# Patient Record
Sex: FEMALE | Race: WHITE | NOT HISPANIC OR LATINO | URBAN - METROPOLITAN AREA
[De-identification: names, ages, dates, MRNs, and addresses within clinical notes are randomized per-mention and may not be internally consistent; named-entity substitution may affect disease eponyms.]

---

## 2017-03-07 ENCOUNTER — OUTPATIENT (OUTPATIENT)
Dept: OUTPATIENT SERVICES | Facility: HOSPITAL | Age: 82
LOS: 1 days | End: 2017-03-07
Payer: MEDICARE

## 2017-03-07 VITALS
WEIGHT: 171.96 LBS | SYSTOLIC BLOOD PRESSURE: 121 MMHG | OXYGEN SATURATION: 97 % | DIASTOLIC BLOOD PRESSURE: 70 MMHG | HEART RATE: 62 BPM | RESPIRATION RATE: 19 BRPM | HEIGHT: 61 IN | TEMPERATURE: 98 F

## 2017-03-07 DIAGNOSIS — Z96.641 PRESENCE OF RIGHT ARTIFICIAL HIP JOINT: Chronic | ICD-10-CM

## 2017-03-07 DIAGNOSIS — M65.321 TRIGGER FINGER, RIGHT INDEX FINGER: ICD-10-CM

## 2017-03-07 DIAGNOSIS — Z95.810 PRESENCE OF AUTOMATIC (IMPLANTABLE) CARDIAC DEFIBRILLATOR: Chronic | ICD-10-CM

## 2017-03-07 DIAGNOSIS — G56.02 CARPAL TUNNEL SYNDROME, LEFT UPPER LIMB: Chronic | ICD-10-CM

## 2017-03-07 DIAGNOSIS — Z98.41 CATARACT EXTRACTION STATUS, RIGHT EYE: Chronic | ICD-10-CM

## 2017-03-07 DIAGNOSIS — G56.01 CARPAL TUNNEL SYNDROME, RIGHT UPPER LIMB: ICD-10-CM

## 2017-03-07 DIAGNOSIS — Z01.818 ENCOUNTER FOR OTHER PREPROCEDURAL EXAMINATION: ICD-10-CM

## 2017-03-07 DIAGNOSIS — Z90.49 ACQUIRED ABSENCE OF OTHER SPECIFIED PARTS OF DIGESTIVE TRACT: Chronic | ICD-10-CM

## 2017-03-07 LAB
ANION GAP SERPL CALC-SCNC: 8 MMOL/L — SIGNIFICANT CHANGE UP (ref 5–17)
BUN SERPL-MCNC: 32 MG/DL — HIGH (ref 7–23)
CALCIUM SERPL-MCNC: 10 MG/DL — SIGNIFICANT CHANGE UP (ref 8.4–10.5)
CHLORIDE SERPL-SCNC: 105 MMOL/L — SIGNIFICANT CHANGE UP (ref 96–108)
CO2 SERPL-SCNC: 30 MMOL/L — SIGNIFICANT CHANGE UP (ref 22–31)
CREAT SERPL-MCNC: 1 MG/DL — SIGNIFICANT CHANGE UP (ref 0.5–1.3)
GLUCOSE SERPL-MCNC: 129 MG/DL — HIGH (ref 70–99)
HCT VFR BLD CALC: 37.6 % — SIGNIFICANT CHANGE UP (ref 34.5–45)
HGB BLD-MCNC: 11.8 G/DL — SIGNIFICANT CHANGE UP (ref 11.5–15.5)
MCHC RBC-ENTMCNC: 31.3 GM/DL — LOW (ref 32–36)
MCHC RBC-ENTMCNC: 32 PG — SIGNIFICANT CHANGE UP (ref 27–34)
MCV RBC AUTO: 102.2 FL — HIGH (ref 80–100)
PLATELET # BLD AUTO: 229 K/UL — SIGNIFICANT CHANGE UP (ref 150–400)
POTASSIUM SERPL-MCNC: 4.9 MMOL/L — SIGNIFICANT CHANGE UP (ref 3.5–5.3)
POTASSIUM SERPL-SCNC: 4.9 MMOL/L — SIGNIFICANT CHANGE UP (ref 3.5–5.3)
RBC # BLD: 3.68 M/UL — LOW (ref 3.8–5.2)
RBC # FLD: 13.6 % — SIGNIFICANT CHANGE UP (ref 10.3–14.5)
SODIUM SERPL-SCNC: 143 MMOL/L — SIGNIFICANT CHANGE UP (ref 135–145)
WBC # BLD: 7.5 K/UL — SIGNIFICANT CHANGE UP (ref 3.8–10.5)
WBC # FLD AUTO: 7.5 K/UL — SIGNIFICANT CHANGE UP (ref 3.8–10.5)

## 2017-03-07 PROCEDURE — 93005 ELECTROCARDIOGRAM TRACING: CPT

## 2017-03-07 PROCEDURE — G0463: CPT

## 2017-03-07 PROCEDURE — 93010 ELECTROCARDIOGRAM REPORT: CPT | Mod: NC

## 2017-03-07 PROCEDURE — 85027 COMPLETE CBC AUTOMATED: CPT

## 2017-03-07 PROCEDURE — 36415 COLL VENOUS BLD VENIPUNCTURE: CPT

## 2017-03-07 PROCEDURE — 80048 BASIC METABOLIC PNL TOTAL CA: CPT

## 2017-03-07 RX ORDER — PANTOPRAZOLE SODIUM 20 MG/1
1 TABLET, DELAYED RELEASE ORAL
Qty: 0 | Refills: 0 | COMMUNITY

## 2017-03-07 NOTE — H&P PST ADULT - FAMILY HISTORY
Sibling  Still living? Unknown  Family history of breast cancer, Age at diagnosis: Age Unknown     Mother  Still living? Unknown  Family history of uterine cancer, Age at diagnosis: Age Unknown

## 2017-03-07 NOTE — H&P PST ADULT - REASON FOR ADMISSION
" Dr. Daniel is going to operate on my right hand for carpal tunnel surgery and my right hand index finger"

## 2017-03-07 NOTE — H&P PST ADULT - MUSCULOSKELETAL COMMENTS
right carpal tunnel syndrome and right index trigger finger limited ROM to bilateral wrist s/t pain. Joint swelling of right great toe, followed by PCP

## 2017-03-07 NOTE — H&P PST ADULT - NSANTHOSAYNRD_GEN_A_CORE
No. ARTIE screening performed.  STOP BANG Legend: 0-2 = LOW Risk; 3-4 = INTERMEDIATE Risk; 5-8 = HIGH Risk

## 2017-03-07 NOTE — H&P PST ADULT - SKIN COMMENTS
pt pointed out the lump on left clavicle, movable soft, nontender, measuring about [id number], pt follows it with PCP

## 2017-03-07 NOTE — H&P PST ADULT - PMH
AICD (automatic cardioverter/defibrillator) present    Anxiety    Aortic stenosis    CAD (coronary artery disease)    Carpal tunnel syndrome on right    Depression    Fibromyalgia    GERD (gastroesophageal reflux disease)    History of CHF (congestive heart failure)    HTN (hypertension)    Non-ischemic cardiomyopathy    Obesity (BMI 30.0-34.9)    Osteoarthritis    Peptic ulcer    Restless leg syndrome    Trigger finger, right index finger

## 2017-03-07 NOTE — H&P PST ADULT - HISTORY OF PRESENT ILLNESS
85 yo female scheduled for " right carpal tunnel release and right index finger A1 pulley release" on 3/24/17 with Flor Daniel MD.   Patient complains of right hand pain for more than a year, constant 10/10 aching, loss of full sensation with numbness and tingling to all fingers radiating to upper right arm. She has tried cortisone injection without relief. Patient uses walker for stability and walking.   Accompanied by daughter today.

## 2017-03-07 NOTE — H&P PST ADULT - PROBLEM SELECTOR PLAN 1
"Right carpal tunnel release and right index finger  A1 pulley release" on 3/24/17.  Patient and daughter, Eleonora, was given pre-op instructions, acknowledged and signed.  Patient and daughter were told to obtain Medical Clearance, Cardiac Clearance, and Defibrillator  interrogation before set surgery date.

## 2017-03-07 NOTE — H&P PST ADULT - PSH
Cardiac defibrillator in place    Carpal tunnel syndrome of left wrist  2016  H/O bilateral cataract extraction    H/O total hip arthroplasty, right    History of cholecystectomy  30 yrs ago

## 2017-03-07 NOTE — H&P PST ADULT - NEUROLOGICAL DETAILS
strength decreased/responds to pain/alert and oriented x 3/responds to verbal commands/uses walker for stability and walking, unsteady gait

## 2017-03-07 NOTE — H&P PST ADULT - ADDITIONAL PE
Right carpal tunnel syndrome, right index trigger finger with locking and stiffness  pain right wrist radiating to all fingers with numbness and tingling

## 2017-03-07 NOTE — H&P PST ADULT - MUSCULOSKELETAL
details… detailed exam no joint warmth/no joint swelling/calf tenderness/decreased ROM/joint swelling/decreased ROM due to pain

## 2017-03-07 NOTE — H&P PST ADULT - NEGATIVE ENMT SYMPTOMS
no dry mouth/no nasal discharge/no nose bleeds/no tinnitus/no dysphagia/no sinus symptoms/no ear pain/no recurrent cold sores/no vertigo/no nasal obstruction/no nasal congestion/no gum bleeding/no abnormal taste sensation/no throat pain/no hearing difficulty/no post-nasal discharge

## 2017-03-07 NOTE — H&P PST ADULT - NECK DETAILS
normal thyroid gland/supple/defibrillator palpable left upper chest wall/cervical vertebral tenderness

## 2017-03-17 NOTE — ASU DISCHARGE PLAN (ADULT/PEDIATRIC). - NOTIFY
Increased Irritability or Sluggishness/Excessive Diarrhea/Inability to Tolerate Liquids or Foods/Bleeding that does not stop/Persistent Nausea and Vomiting/Unable to Urinate/Swelling that continues/Pain not relieved by Medications/Numbness, color, or temperature change to extremity/Numbness, tingling/Fever greater than 101

## 2017-03-17 NOTE — ASU DISCHARGE PLAN (ADULT/PEDIATRIC). - MEDICATION SUMMARY - MEDICATIONS TO TAKE
I will START or STAY ON the medications listed below when I get home from the hospital:    Vicodin 5 mg-300 mg oral tablet  -- 1 tab(s) by mouth every 6 hours  -- Indication: For pain    Cymbalta  --  by mouth   -- Indication: For depression    prochlorperazine 10 mg oral tablet  -- 1 tab(s) by mouth every 4 to 6 hours, As Needed  -- nausea  -- Indication: For as per md    rotigotine  -- 1 patch by transdermal patch once a day  -- Indication: For as per md    ALPRAZolam 0.25 mg oral tablet  --  by mouth , As Needed  -- Indication: For as per md    carvedilol 6.25 mg oral tablet  -- 1 tab(s) by mouth 2 times a day  -- Indication: For as per md    lidocaine topical  -- Apply on skin to affected area   -- Indication: For as per md    Lasix 40 mg oral tablet  -- tab(s) by mouth once a day  -- Indication: For as per md    docusate sodium 100 mg oral capsule  -- 1 cap(s) by mouth 3 times a day  -- Indication: For as per md    Glucosamine Chondroitin Advanced oral tablet  --  by mouth   -- Indication: For as per md    omega-3 polyunsaturated fatty acids 1000 mg oral capsule  --  by mouth   -- Indication: For health    Vitamin C 500 mg oral tablet  -- 1 tab(s) by mouth once a day  -- Indication: For health    Vitamin B-12  -- 2000 microgram(s) by mouth once a day  -- Indication: For health    Vitamin D3 2000 intl units oral capsule  -- 1 cap(s) by mouth once a day  -- Indication: For hea;th    folic acid 1 mg oral tablet  -- tab(s) by mouth once a day  -- Indication: For he;th

## 2017-03-24 ENCOUNTER — TRANSCRIPTION ENCOUNTER (OUTPATIENT)
Age: 82
End: 2017-03-24

## 2017-03-24 ENCOUNTER — OUTPATIENT (OUTPATIENT)
Dept: OUTPATIENT SERVICES | Facility: HOSPITAL | Age: 82
LOS: 1 days | Discharge: HOME | End: 2017-03-24
Payer: MEDICARE

## 2017-03-24 VITALS
OXYGEN SATURATION: 100 % | SYSTOLIC BLOOD PRESSURE: 145 MMHG | RESPIRATION RATE: 18 BRPM | DIASTOLIC BLOOD PRESSURE: 54 MMHG | HEART RATE: 57 BPM

## 2017-03-24 VITALS
HEIGHT: 60 IN | OXYGEN SATURATION: 99 % | RESPIRATION RATE: 18 BRPM | DIASTOLIC BLOOD PRESSURE: 73 MMHG | SYSTOLIC BLOOD PRESSURE: 119 MMHG | HEART RATE: 54 BPM | WEIGHT: 178.79 LBS | TEMPERATURE: 97 F

## 2017-03-24 DIAGNOSIS — G56.02 CARPAL TUNNEL SYNDROME, LEFT UPPER LIMB: Chronic | ICD-10-CM

## 2017-03-24 DIAGNOSIS — Z96.641 PRESENCE OF RIGHT ARTIFICIAL HIP JOINT: Chronic | ICD-10-CM

## 2017-03-24 DIAGNOSIS — Z95.810 PRESENCE OF AUTOMATIC (IMPLANTABLE) CARDIAC DEFIBRILLATOR: Chronic | ICD-10-CM

## 2017-03-24 DIAGNOSIS — Z90.49 ACQUIRED ABSENCE OF OTHER SPECIFIED PARTS OF DIGESTIVE TRACT: Chronic | ICD-10-CM

## 2017-03-24 DIAGNOSIS — Z98.41 CATARACT EXTRACTION STATUS, RIGHT EYE: Chronic | ICD-10-CM

## 2017-03-24 DIAGNOSIS — M65.321 TRIGGER FINGER, RIGHT INDEX FINGER: ICD-10-CM

## 2017-03-24 DIAGNOSIS — Z01.818 ENCOUNTER FOR OTHER PREPROCEDURAL EXAMINATION: ICD-10-CM

## 2017-03-24 DIAGNOSIS — G56.01 CARPAL TUNNEL SYNDROME, RIGHT UPPER LIMB: ICD-10-CM

## 2017-03-24 PROCEDURE — 26055 INCISE FINGER TENDON SHEATH: CPT | Mod: F6,XS

## 2017-03-24 PROCEDURE — 26145 TENDON EXCISION PALM/FINGER: CPT | Mod: F6

## 2017-03-24 PROCEDURE — 64721 CARPAL TUNNEL SURGERY: CPT | Mod: RT

## 2017-03-24 RX ORDER — ASCORBIC ACID 60 MG
1 TABLET,CHEWABLE ORAL
Qty: 0 | Refills: 0 | COMMUNITY

## 2017-03-24 RX ORDER — PROCHLORPERAZINE MALEATE 5 MG
1 TABLET ORAL
Qty: 0 | Refills: 0 | COMMUNITY

## 2017-03-24 RX ORDER — ACETAMINOPHEN 500 MG
650 TABLET ORAL ONCE
Qty: 0 | Refills: 0 | Status: DISCONTINUED | OUTPATIENT
Start: 2017-03-24 | End: 2017-04-08

## 2017-03-24 RX ORDER — LIDOCAINE 4 G/100G
1 CREAM TOPICAL
Qty: 0 | Refills: 0 | COMMUNITY

## 2017-03-24 RX ORDER — SODIUM CHLORIDE 9 MG/ML
1000 INJECTION, SOLUTION INTRAVENOUS
Qty: 0 | Refills: 0 | Status: DISCONTINUED | OUTPATIENT
Start: 2017-03-24 | End: 2017-03-24

## 2017-03-24 RX ORDER — ROTIGOTINE 8 MG/24H
1 PATCH, EXTENDED RELEASE TRANSDERMAL
Qty: 0 | Refills: 0 | COMMUNITY

## 2017-03-24 RX ORDER — CEFAZOLIN SODIUM 1 G
2000 VIAL (EA) INJECTION ONCE
Qty: 0 | Refills: 0 | Status: COMPLETED | OUTPATIENT
Start: 2017-03-24 | End: 2017-03-24

## 2017-03-24 RX ADMIN — SODIUM CHLORIDE 100 MILLILITER(S): 9 INJECTION, SOLUTION INTRAVENOUS at 09:24

## 2017-08-07 ENCOUNTER — EMERGENCY (EMERGENCY)
Facility: HOSPITAL | Age: 82
LOS: 1 days | Discharge: HOME | End: 2017-08-07
Attending: STUDENT IN AN ORGANIZED HEALTH CARE EDUCATION/TRAINING PROGRAM
Payer: MEDICARE

## 2017-08-07 VITALS
TEMPERATURE: 99 F | OXYGEN SATURATION: 95 % | SYSTOLIC BLOOD PRESSURE: 150 MMHG | RESPIRATION RATE: 18 BRPM | HEART RATE: 74 BPM | DIASTOLIC BLOOD PRESSURE: 75 MMHG

## 2017-08-07 VITALS
TEMPERATURE: 98 F | OXYGEN SATURATION: 96 % | DIASTOLIC BLOOD PRESSURE: 80 MMHG | RESPIRATION RATE: 20 BRPM | HEART RATE: 81 BPM | SYSTOLIC BLOOD PRESSURE: 179 MMHG | HEIGHT: 61 IN | WEIGHT: 160.06 LBS

## 2017-08-07 DIAGNOSIS — Z90.49 ACQUIRED ABSENCE OF OTHER SPECIFIED PARTS OF DIGESTIVE TRACT: Chronic | ICD-10-CM

## 2017-08-07 DIAGNOSIS — Z98.41 CATARACT EXTRACTION STATUS, RIGHT EYE: Chronic | ICD-10-CM

## 2017-08-07 DIAGNOSIS — Z96.641 PRESENCE OF RIGHT ARTIFICIAL HIP JOINT: Chronic | ICD-10-CM

## 2017-08-07 DIAGNOSIS — Z95.810 PRESENCE OF AUTOMATIC (IMPLANTABLE) CARDIAC DEFIBRILLATOR: Chronic | ICD-10-CM

## 2017-08-07 DIAGNOSIS — G56.02 CARPAL TUNNEL SYNDROME, LEFT UPPER LIMB: Chronic | ICD-10-CM

## 2017-08-07 LAB
ALBUMIN SERPL ELPH-MCNC: 4.1 G/DL — SIGNIFICANT CHANGE UP (ref 3.3–5.2)
ALP SERPL-CCNC: 96 U/L — SIGNIFICANT CHANGE UP (ref 40–120)
ALT FLD-CCNC: 24 U/L — SIGNIFICANT CHANGE UP
ANION GAP SERPL CALC-SCNC: 14 MMOL/L — SIGNIFICANT CHANGE UP (ref 5–17)
AST SERPL-CCNC: 24 U/L — SIGNIFICANT CHANGE UP
BASOPHILS # BLD AUTO: 0 K/UL — SIGNIFICANT CHANGE UP (ref 0–0.2)
BASOPHILS NFR BLD AUTO: 0.3 % — SIGNIFICANT CHANGE UP (ref 0–2)
BILIRUB SERPL-MCNC: 0.3 MG/DL — LOW (ref 0.4–2)
BUN SERPL-MCNC: 18 MG/DL — SIGNIFICANT CHANGE UP (ref 8–20)
CALCIUM SERPL-MCNC: 9.7 MG/DL — SIGNIFICANT CHANGE UP (ref 8.6–10.2)
CHLORIDE SERPL-SCNC: 102 MMOL/L — SIGNIFICANT CHANGE UP (ref 98–107)
CK SERPL-CCNC: 61 U/L — SIGNIFICANT CHANGE UP (ref 25–170)
CO2 SERPL-SCNC: 24 MMOL/L — SIGNIFICANT CHANGE UP (ref 22–29)
CREAT SERPL-MCNC: 0.75 MG/DL — SIGNIFICANT CHANGE UP (ref 0.5–1.3)
EOSINOPHIL # BLD AUTO: 0.1 K/UL — SIGNIFICANT CHANGE UP (ref 0–0.5)
EOSINOPHIL NFR BLD AUTO: 0.9 % — SIGNIFICANT CHANGE UP (ref 0–6)
GLUCOSE SERPL-MCNC: 115 MG/DL — SIGNIFICANT CHANGE UP (ref 70–115)
HCT VFR BLD CALC: 36.2 % — LOW (ref 37–47)
HGB BLD-MCNC: 11.8 G/DL — LOW (ref 12–16)
LYMPHOCYTES # BLD AUTO: 2.9 K/UL — SIGNIFICANT CHANGE UP (ref 1–4.8)
LYMPHOCYTES # BLD AUTO: 36.7 % — SIGNIFICANT CHANGE UP (ref 20–55)
MAGNESIUM SERPL-MCNC: 2.2 MG/DL — SIGNIFICANT CHANGE UP (ref 1.6–2.6)
MCHC RBC-ENTMCNC: 31.9 PG — HIGH (ref 27–31)
MCHC RBC-ENTMCNC: 32.6 G/DL — SIGNIFICANT CHANGE UP (ref 32–36)
MCV RBC AUTO: 97.8 FL — SIGNIFICANT CHANGE UP (ref 81–99)
MONOCYTES # BLD AUTO: 0.4 K/UL — SIGNIFICANT CHANGE UP (ref 0–0.8)
MONOCYTES NFR BLD AUTO: 5.7 % — SIGNIFICANT CHANGE UP (ref 3–10)
NEUTROPHILS # BLD AUTO: 4.4 K/UL — SIGNIFICANT CHANGE UP (ref 1.8–8)
NEUTROPHILS NFR BLD AUTO: 56 % — SIGNIFICANT CHANGE UP (ref 37–73)
PHOSPHATE SERPL-MCNC: 2.1 MG/DL — LOW (ref 2.4–4.7)
PLATELET # BLD AUTO: 269 K/UL — SIGNIFICANT CHANGE UP (ref 150–400)
POTASSIUM SERPL-MCNC: 4.1 MMOL/L — SIGNIFICANT CHANGE UP (ref 3.5–5.3)
POTASSIUM SERPL-SCNC: 4.1 MMOL/L — SIGNIFICANT CHANGE UP (ref 3.5–5.3)
PROT SERPL-MCNC: 7.7 G/DL — SIGNIFICANT CHANGE UP (ref 6.6–8.7)
RBC # BLD: 3.7 M/UL — LOW (ref 4.4–5.2)
RBC # FLD: 14 % — SIGNIFICANT CHANGE UP (ref 11–15.6)
SODIUM SERPL-SCNC: 140 MMOL/L — SIGNIFICANT CHANGE UP (ref 135–145)
TROPONIN T SERPL-MCNC: <0.01 NG/ML — SIGNIFICANT CHANGE UP (ref 0–0.06)
WBC # BLD: 7.9 K/UL — SIGNIFICANT CHANGE UP (ref 4.8–10.8)
WBC # FLD AUTO: 7.9 K/UL — SIGNIFICANT CHANGE UP (ref 4.8–10.8)

## 2017-08-07 PROCEDURE — 99284 EMERGENCY DEPT VISIT MOD MDM: CPT | Mod: 25

## 2017-08-07 PROCEDURE — 99285 EMERGENCY DEPT VISIT HI MDM: CPT

## 2017-08-07 PROCEDURE — 80053 COMPREHEN METABOLIC PANEL: CPT

## 2017-08-07 PROCEDURE — 70450 CT HEAD/BRAIN W/O DYE: CPT | Mod: 26

## 2017-08-07 PROCEDURE — 70450 CT HEAD/BRAIN W/O DYE: CPT

## 2017-08-07 PROCEDURE — 93005 ELECTROCARDIOGRAM TRACING: CPT

## 2017-08-07 PROCEDURE — 82550 ASSAY OF CK (CPK): CPT

## 2017-08-07 PROCEDURE — 84100 ASSAY OF PHOSPHORUS: CPT

## 2017-08-07 PROCEDURE — 85027 COMPLETE CBC AUTOMATED: CPT

## 2017-08-07 PROCEDURE — 83735 ASSAY OF MAGNESIUM: CPT

## 2017-08-07 PROCEDURE — 84484 ASSAY OF TROPONIN QUANT: CPT

## 2017-08-07 PROCEDURE — 93010 ELECTROCARDIOGRAM REPORT: CPT

## 2017-08-07 PROCEDURE — 36415 COLL VENOUS BLD VENIPUNCTURE: CPT

## 2017-08-07 RX ORDER — PROCHLORPERAZINE MALEATE 5 MG
10 TABLET ORAL
Qty: 0 | Refills: 0 | COMMUNITY

## 2017-08-07 RX ORDER — MAGNESIUM OXIDE/MAG AA CHELATE 133 MG
1 TABLET ORAL
Qty: 0 | Refills: 0 | COMMUNITY

## 2017-08-07 RX ORDER — IRBESARTAN 75 MG/1
1 TABLET ORAL
Qty: 0 | Refills: 0 | COMMUNITY

## 2017-08-07 RX ORDER — HYDROXYZINE HCL 10 MG
1 TABLET ORAL
Qty: 0 | Refills: 0 | COMMUNITY

## 2017-08-07 RX ORDER — GLUCOSAMINE/MSM/CHONDROITIN A 750-375MG
0 TABLET ORAL
Qty: 0 | Refills: 0 | COMMUNITY

## 2017-08-07 RX ORDER — DULOXETINE HYDROCHLORIDE 30 MG/1
0 CAPSULE, DELAYED RELEASE ORAL
Qty: 0 | Refills: 0 | COMMUNITY

## 2017-08-07 RX ORDER — PANTOPRAZOLE SODIUM 20 MG/1
1 TABLET, DELAYED RELEASE ORAL
Qty: 0 | Refills: 0 | COMMUNITY

## 2017-08-07 RX ORDER — CYCLOBENZAPRINE HYDROCHLORIDE 10 MG/1
1 TABLET, FILM COATED ORAL
Qty: 0 | Refills: 0 | COMMUNITY

## 2017-08-07 RX ORDER — CHLORZOXAZONE 250 MG
1 TABLET ORAL
Qty: 0 | Refills: 0 | COMMUNITY

## 2017-08-07 NOTE — ED PROVIDER NOTE - OBJECTIVE STATEMENT
This patient is an 86 year old woman with a past medical history of restless leg syndrome who presents to the ER from her PMD c/o tremors.  Patient states that she began experiencing mild truncal tremors one week ago.  She called her neurologist who called in a prescription of cyclobenzaprine.  She ambulates with a walker at baseline.  Today she had a scheduled appointment with her PMD and when she mentioned her symptoms was instructed to come to the ER.  Although triage note reports SOB and vomiting patient denies vomiting.  She states that she sometimes gets SOB when she over exerts herself but denies SOB at this time.

## 2017-08-07 NOTE — ED ADULT TRIAGE NOTE - CHIEF COMPLAINT QUOTE
Patient arrived to ED today with c/o tremors, vomiting, and SOB.  Patient has EKG preformed at her PMD office that showed PVC's.

## 2017-08-07 NOTE — ED PROVIDER NOTE - PROGRESS NOTE DETAILS
As per sign-out from Dr. Braun, patient awaiting CT and re-assessment. Ct is as noted. Patient ambulatory ( gait noted to by me with mild tremor), improved from presentation as per patient, and she feels comfortable and safe going home.

## 2017-08-07 NOTE — ED PROVIDER NOTE - NEUROLOGICAL, MLM
Alert and oriented, no focal deficits, no motor or sensory deficits. Truncal tremors noted with instibikity with standing.

## 2017-08-07 NOTE — ED ADULT NURSE REASSESSMENT NOTE - NS ED NURSE REASSESS COMMENT FT1
MD Alonzo at bedside and D/C'ed pt, Md removed IV
Pt ambulates with walker without difficulty, MD Alonzo made aware
Assuming care from previous RN, pt A&O x's 4, resp even and unlabored, color good, denies pain, c/o being hungry, pt made aware of asking MD and will get food if allowed, SALAS without difficulty, skin intact warm and dry, pt and family made aware of plan of care, awaiting CT results and dispo, will continue to monitor

## 2017-08-07 NOTE — ED ADULT NURSE NOTE - OBJECTIVE STATEMENT
87y/o female sent by PMD for tremors. Pt aox3, resp even unlabored, abd soft, distended, c/o constipation, non tender, +Bowel sounds. denies chest pain, new numbness or tingling. Pt has a hx of restless leg syndrome, and carpal tunnel. will continue to monitor.

## 2019-01-29 NOTE — ASU DISCHARGE PLAN (ADULT/PEDIATRIC). - FOLLOWUP APPOINTMENT CLINIC/PHYSICIAN
Please call Dr. RAJEEV Daniel's office ([phone]) to schedule a follow-up appointment to be seen next week. Please call Dr. RAJEEV Daniel's office ([phone]) to schedule a follow-up appointment to be seen next Tuesday. English

## 2019-08-09 NOTE — ED ADULT NURSE NOTE - CAS DISCH CONDITION
General Surgery End of Shift Nursing Note    Bedside shift change report given to [facility], RN (oncoming nurse) by Maria Esther Chino RN (offgoing nurse). Report included the following information SBAR, Kardex, Intake/Output and Recent Results. Shift worked:   [employer]A to [employer] of shift:    Wound vac changed today, DANA back to suction. Issues for physician to address:        Number times ambulated in hallway past shift: 2     Number of times OOB to chair past shift: 3    Pain Management:  Current medication: see MAR  Patient states pain is manageable on current pain medication: YES    GI:    Current diet:  DIET CARDIAC    Tolerating current diet: YES  Passing flatus: YES  Last Bowel Movement: several days ago 8/6/19   Appearance:     Respiratory:    Incentive Spirometer at bedside: YES  Patient instructed on use: YES    Patient Safety:    Falls Score: 2  Bed Alarm On? No  Sitter?  No    Gladysdia Pickens, RN Stable

## 2020-08-21 NOTE — ASU DISCHARGE PLAN (ADULT/PEDIATRIC). - DRESSING FT
Last [facility] 8/2019-to Dr. Arcelia Villalobos, pharmacy updated KEEP BANDAGES INTACT UNTIL SEEN BY SURGEON.

## 2022-04-25 NOTE — ED ADULT NURSE NOTE - RESPIRATORY ASSESSMENT
WDL Cephalexin Pregnancy And Lactation Text: This medication is Pregnancy Category B and considered safe during pregnancy.  It is also excreted in breast milk but can be used safely for shorter doses.

## 2022-08-05 NOTE — H&P PST ADULT - AS O2 DELIVERY
Problem: At Risk for Falls  Goal: # Patient does not fall  Outcome: Outcome Not Met, Continue to Monitor     Problem: At Risk for Injury Due to Fall  Goal: # Patient does not fall  Outcome: Outcome Not Met, Continue to Monitor     Problem: Activity Intolerance  Goal: # Functional status is maintained or returned to baseline  Outcome: Outcome Not Met, Continue to Monitor      room air

## 2022-09-15 NOTE — H&P PST ADULT - VENOUS THROMBOEMBOLISM BMI
31-40 (obesity) Azelaic Acid Pregnancy And Lactation Text: This medication is considered safe during pregnancy and breast feeding.

## 2022-11-16 NOTE — ED PROVIDER NOTE - DISCHARGE DATE
Billing Type: Third-Party Bill
Bill For Surgical Tray: no
Expected Date Of Service: 04/07/2021
07-Aug-2017

## 2022-12-07 ENCOUNTER — OFFICE (OUTPATIENT)
Dept: URBAN - METROPOLITAN AREA CLINIC 104 | Facility: CLINIC | Age: 87
Setting detail: OPHTHALMOLOGY
End: 2022-12-07
Payer: MEDICARE

## 2022-12-07 DIAGNOSIS — H20.00: ICD-10-CM

## 2022-12-07 PROCEDURE — 99213 OFFICE O/P EST LOW 20 MIN: CPT | Performed by: OPTOMETRIST

## 2022-12-07 ASSESSMENT — REFRACTION_CURRENTRX
OD_ADD: +2.75
OS_ADD: +3.25
OS_OVR_VA: 20/
OD_CYLINDER: -1.50
OS_CYLINDER: -1.50
OD_CYLINDER: -2.00
OS_OVR_VA: 20/
OD_OVR_VA: 20/
OS_AXIS: 77
OS_AXIS: 96
OD_OVR_VA: 20/
OD_SPHERE: +2.00
OD_AXIS: 88
OD_SPHERE: +2.50
OD_ADD: +3.25
OS_SPHERE: +1.75
OD_AXIS: 89
OS_CYLINDER: -1.75
OS_SPHERE: +1.26
OS_ADD: +2.75

## 2022-12-07 ASSESSMENT — LID EXAM ASSESSMENTS
OD_BLEPHARITIS: RLL RUL 1+ 2+
OS_BLEPHARITIS: LLL LUL 1+ 2+

## 2022-12-07 ASSESSMENT — VISUAL ACUITY
OS_BCVA: 20/30
OD_BCVA: 20/60

## 2022-12-07 ASSESSMENT — CONFRONTATIONAL VISUAL FIELD TEST (CVF)
OS_FINDINGS: FULL
OD_FINDINGS: FULL

## 2022-12-07 ASSESSMENT — TONOMETRY: OS_IOP_MMHG: 19

## 2022-12-07 ASSESSMENT — CORNEAL DYSTROPHY - POSTERIOR: OS_POSTERIORDYSTROPHY: FUCHS

## 2022-12-14 PROBLEM — E66.9 OBESITY, UNSPECIFIED: Chronic | Status: ACTIVE | Noted: 2017-03-07

## 2022-12-14 PROBLEM — G56.01 CARPAL TUNNEL SYNDROME, RIGHT UPPER LIMB: Chronic | Status: ACTIVE | Noted: 2017-03-07

## 2022-12-14 PROBLEM — F41.9 ANXIETY DISORDER, UNSPECIFIED: Chronic | Status: ACTIVE | Noted: 2017-03-07

## 2022-12-14 PROBLEM — M65.321 TRIGGER FINGER, RIGHT INDEX FINGER: Chronic | Status: ACTIVE | Noted: 2017-03-07

## 2022-12-14 PROBLEM — Z86.79 PERSONAL HISTORY OF OTHER DISEASES OF THE CIRCULATORY SYSTEM: Chronic | Status: ACTIVE | Noted: 2017-03-07

## 2022-12-14 PROBLEM — G25.81 RESTLESS LEGS SYNDROME: Chronic | Status: ACTIVE | Noted: 2017-03-07

## 2022-12-16 ENCOUNTER — APPOINTMENT (OUTPATIENT)
Dept: ORTHOPEDIC SURGERY | Facility: CLINIC | Age: 87
End: 2022-12-16

## 2022-12-16 VITALS
SYSTOLIC BLOOD PRESSURE: 136 MMHG | HEART RATE: 61 BPM | BODY MASS INDEX: 30.21 KG/M2 | WEIGHT: 160 LBS | HEIGHT: 61 IN | DIASTOLIC BLOOD PRESSURE: 73 MMHG

## 2022-12-16 DIAGNOSIS — M25.552 PAIN IN LEFT HIP: ICD-10-CM

## 2022-12-16 DIAGNOSIS — Z96.641 PRESENCE OF RIGHT ARTIFICIAL HIP JOINT: ICD-10-CM

## 2022-12-16 PROCEDURE — 99204 OFFICE O/P NEW MOD 45 MIN: CPT

## 2022-12-16 PROCEDURE — 73502 X-RAY EXAM HIP UNI 2-3 VIEWS: CPT

## 2022-12-16 NOTE — HISTORY OF PRESENT ILLNESS
[de-identified] : Ms. LYNNE JOSEPH is a 89 year old female s/p right THR in 2014, presenting for one week history of increased right groin and buttock pain, following a fall out of bed on 12/10/22. Patient states she fell out of bed at three am and landed on the right side. She since has been unable to bear weight on this side. Patient states prior to this she walked minimally with a walker and uses the wheel chair mostly. Patient had a cardiac defibrillator, not currently on anticoagulants.

## 2022-12-16 NOTE — CONSULT LETTER
[Dear  ___] : Dear  [unfilled], [Consult Letter:] : I had the pleasure of evaluating your patient, [unfilled]. [Please see my note below.] : Please see my note below. [Consult Closing:] : Thank you very much for allowing me to participate in the care of this patient.  If you have any questions, please do not hesitate to contact me. [Sincerely,] : Sincerely, [FreeTextEntry2] : LUCIA KAUR\par  [FreeTextEntry3] : Philipp Wagner MD\par Chief of Joint Replacement\par Primary & Revision Hip and Knee Replacement \par [facility] [place]\par \par

## 2022-12-16 NOTE — ADDENDUM
[FreeTextEntry1] : This note was authored by Pacheco Luis working as a medical scribe for Dr. Philpip Wagner. The note was reviewed, edited, and revised by Dr. Philipp Wagner whom is in agreement with the exam findings, imaging findings, and treatment plan. 12/16/2022

## 2022-12-16 NOTE — PHYSICAL EXAM
[de-identified] : The patient appears well nourished  and in no apparent distress.  The patient is alert and oriented to person, place, and time.   Affect and mood appear normal. The head is normocephalic and atraumatic.  The eyes reveal normal sclera and extra ocular muscles are intact. The tongue is midline with no apparent lesions.  Skin shows normal turgor with no evidence of eczema or psoriasis.  No respiratory distress noted.  Sensation grossly intact.		  [de-identified] : Exam of the right hip shows she can do active hip flexion when sitting. There is groin pain with hip rotation.\par 5/5 motor strength bilaterally distally. Sensation intact distally.		  [de-identified] :  X-ray: AP of the pelvis and 2 views of the right hip demonstrate a right total hip arthroplasty with figure 7 augment of the acetabulum.  There is a broken screw in the ischium which is probably chronic.  Overall there is no gross loosening of the acetabular or femoral components.  No periprosthetic fracture visualized either.

## 2022-12-16 NOTE — DISCUSSION/SUMMARY
[de-identified] : LYNNE JOSEPH is a 89 year old female who presents s/p right revision THR with right hip pain following a fall. X-ray does not show evidence of fracture and her implants appear in stable position. The patient will be sent for a CT scan of her right hip to rule out periprosthetic fracture. She will follow up when imaging is complete.  In the meantime she will remain mostly wheelchair-bound which is her baseline.  When transferring she will complete most of the weight on the left side.

## 2022-12-23 RX ORDER — MELOXICAM 7.5 MG/1
7.5 TABLET ORAL
Qty: 60 | Refills: 0 | Status: ACTIVE | COMMUNITY
Start: 2022-12-23 | End: 1900-01-01

## 2023-04-25 ENCOUNTER — RX ONLY (RX ONLY)
Age: 88
End: 2023-04-25

## 2023-04-25 ENCOUNTER — OFFICE (OUTPATIENT)
Dept: URBAN - METROPOLITAN AREA CLINIC 104 | Facility: CLINIC | Age: 88
Setting detail: OPHTHALMOLOGY
End: 2023-04-25
Payer: MEDICARE

## 2023-04-25 DIAGNOSIS — H01.002: ICD-10-CM

## 2023-04-25 DIAGNOSIS — H01.004: ICD-10-CM

## 2023-04-25 DIAGNOSIS — H01.001: ICD-10-CM

## 2023-04-25 DIAGNOSIS — H01.005: ICD-10-CM

## 2023-04-25 PROCEDURE — 99213 OFFICE O/P EST LOW 20 MIN: CPT | Performed by: OPTOMETRIST

## 2023-04-25 ASSESSMENT — LID EXAM ASSESSMENTS
OD_BLEPHARITIS: RLL RUL 1+
OS_BLEPHARITIS: LLL LUL 1+ 2+

## 2023-04-25 ASSESSMENT — REFRACTION_CURRENTRX
OS_OVR_VA: 20/
OS_ADD: +3.25
OD_VPRISM_DIRECTION: PROGS
OS_VPRISM_DIRECTION: PROGS
OD_AXIS: 88
OD_SPHERE: +2.00
OD_OVR_VA: 20/
OD_ADD: +3.25
OS_AXIS: 072
OD_AXIS: 084
OD_ADD: +3.25
OD_SPHERE: +2.00
OD_CYLINDER: -2.00
OS_CYLINDER: -1.75
OS_OVR_VA: 20/
OS_SPHERE: +1.75
OS_SPHERE: +1.75
OS_ADD: +3.25
OD_OVR_VA: 20/
OD_CYLINDER: -2.00
OS_AXIS: 77
OS_CYLINDER: -1.50

## 2023-04-25 ASSESSMENT — SUPERFICIAL PUNCTATE KERATITIS (SPK)
OS_SPK: 2+
OD_SPK: 1+

## 2023-04-25 ASSESSMENT — CONFRONTATIONAL VISUAL FIELD TEST (CVF)
OD_FINDINGS: FULL
OS_FINDINGS: FULL

## 2023-04-25 ASSESSMENT — CORNEAL DYSTROPHY - POSTERIOR: OS_POSTERIORDYSTROPHY: FUCHS

## 2023-04-25 ASSESSMENT — VISUAL ACUITY
OS_BCVA: 20/30
OD_BCVA: 20/30+1

## 2023-04-25 ASSESSMENT — REFRACTION_AUTOREFRACTION
OD_AXIS: 069
OD_CYLINDER: -1.00
OD_SPHERE: +1.00

## 2023-04-25 ASSESSMENT — TONOMETRY
OD_IOP_MMHG: 10
OS_IOP_MMHG: 10

## 2023-04-25 ASSESSMENT — SPHEQUIV_DERIVED: OD_SPHEQUIV: 0.5

## 2024-04-25 ENCOUNTER — OFFICE (OUTPATIENT)
Dept: URBAN - METROPOLITAN AREA CLINIC 104 | Facility: CLINIC | Age: 89
Setting detail: OPHTHALMOLOGY
End: 2024-04-25
Payer: MEDICARE

## 2024-04-25 DIAGNOSIS — H16.223: ICD-10-CM

## 2024-04-25 DIAGNOSIS — H26.493: ICD-10-CM

## 2024-04-25 DIAGNOSIS — Z96.1: ICD-10-CM

## 2024-04-25 DIAGNOSIS — H01.001: ICD-10-CM

## 2024-04-25 DIAGNOSIS — H01.002: ICD-10-CM

## 2024-04-25 DIAGNOSIS — H01.005: ICD-10-CM

## 2024-04-25 DIAGNOSIS — H35.372: ICD-10-CM

## 2024-04-25 DIAGNOSIS — H01.004: ICD-10-CM

## 2024-04-25 DIAGNOSIS — H43.813: ICD-10-CM

## 2024-04-25 DIAGNOSIS — H21.231: ICD-10-CM

## 2024-04-25 PROBLEM — H18.512 ENDOTHELIAL CORNEAL DYSTROPHY; LEFT EYE: Status: ACTIVE | Noted: 2024-04-25

## 2024-04-25 PROCEDURE — 92134 CPTRZ OPH DX IMG PST SGM RTA: CPT | Performed by: SPECIALIST

## 2024-04-25 PROCEDURE — 92014 COMPRE OPH EXAM EST PT 1/>: CPT | Performed by: SPECIALIST

## 2024-04-25 ASSESSMENT — LID EXAM ASSESSMENTS
OS_BLEPHARITIS: LLL LUL 2+
OD_BLEPHARITIS: RLL RUL 2+

## 2024-07-29 NOTE — ED ADULT NURSE NOTE - GASTROINTESTINAL ASSESSMENT
Patient  [phone] called asking if Dr. Reyes will order her bone density test the same day she is having her mammogram on August 14th at [facility]? Please call patient with answer.   WDL

## 2024-09-17 ENCOUNTER — OFFICE (OUTPATIENT)
Dept: URBAN - METROPOLITAN AREA CLINIC 104 | Facility: CLINIC | Age: 89
Setting detail: OPHTHALMOLOGY
End: 2024-09-17
Payer: MEDICARE

## 2024-09-17 DIAGNOSIS — H04.122: ICD-10-CM

## 2024-09-17 DIAGNOSIS — H18.512: ICD-10-CM

## 2024-09-17 DIAGNOSIS — H26.493: ICD-10-CM

## 2024-09-17 DIAGNOSIS — H21.231: ICD-10-CM

## 2024-09-17 DIAGNOSIS — H01.005: ICD-10-CM

## 2024-09-17 DIAGNOSIS — H35.372: ICD-10-CM

## 2024-09-17 DIAGNOSIS — H01.002: ICD-10-CM

## 2024-09-17 DIAGNOSIS — Z96.1: ICD-10-CM

## 2024-09-17 DIAGNOSIS — H01.004: ICD-10-CM

## 2024-09-17 DIAGNOSIS — H01.001: ICD-10-CM

## 2024-09-17 DIAGNOSIS — H43.813: ICD-10-CM

## 2024-09-17 DIAGNOSIS — H04.121: ICD-10-CM

## 2024-09-17 PROCEDURE — 83861 MICROFLUID ANALY TEARS: CPT | Mod: QW,RT | Performed by: SPECIALIST

## 2024-09-17 PROCEDURE — 92025 CPTRIZED CORNEAL TOPOGRAPHY: CPT | Performed by: SPECIALIST

## 2024-09-17 PROCEDURE — 83861 MICROFLUID ANALY TEARS: CPT | Mod: QW,LT | Performed by: SPECIALIST

## 2024-09-17 PROCEDURE — 99213 OFFICE O/P EST LOW 20 MIN: CPT | Performed by: SPECIALIST

## 2024-09-17 ASSESSMENT — LID EXAM ASSESSMENTS
OS_BLEPHARITIS: LLL LUL 2+
OD_BLEPHARITIS: RLL RUL 2+

## 2024-09-17 ASSESSMENT — CONFRONTATIONAL VISUAL FIELD TEST (CVF)
OS_FINDINGS: FULL
OD_FINDINGS: FULL

## 2025-04-09 ENCOUNTER — OFFICE (OUTPATIENT)
Dept: URBAN - METROPOLITAN AREA CLINIC 104 | Facility: CLINIC | Age: OVER 89
Setting detail: OPHTHALMOLOGY
End: 2025-04-09
Payer: MEDICARE

## 2025-04-09 DIAGNOSIS — H01.005: ICD-10-CM

## 2025-04-09 DIAGNOSIS — H18.512: ICD-10-CM

## 2025-04-09 DIAGNOSIS — H01.002: ICD-10-CM

## 2025-04-09 DIAGNOSIS — H26.493: ICD-10-CM

## 2025-04-09 DIAGNOSIS — H43.813: ICD-10-CM

## 2025-04-09 DIAGNOSIS — H04.121: ICD-10-CM

## 2025-04-09 DIAGNOSIS — Z96.1: ICD-10-CM

## 2025-04-09 DIAGNOSIS — H35.372: ICD-10-CM

## 2025-04-09 DIAGNOSIS — H21.231: ICD-10-CM

## 2025-04-09 DIAGNOSIS — H04.122: ICD-10-CM

## 2025-04-09 DIAGNOSIS — H01.001: ICD-10-CM

## 2025-04-09 DIAGNOSIS — H01.004: ICD-10-CM

## 2025-04-09 PROCEDURE — 92014 COMPRE OPH EXAM EST PT 1/>: CPT | Performed by: OPTOMETRIST

## 2025-04-09 ASSESSMENT — TONOMETRY
OS_IOP_MMHG: 10
OD_IOP_MMHG: 10

## 2025-04-09 ASSESSMENT — CORNEAL DYSTROPHY - POSTERIOR: OS_POSTERIORDYSTROPHY: 3+ GUTTATA

## 2025-04-09 ASSESSMENT — REFRACTION_AUTOREFRACTION
OD_SPHERE: +3.00
OS_CYLINDER: -2.75
OS_AXIS: 80
OD_CYLINDER: -2.50
OS_SPHERE: +1.75
OD_AXIS: 71

## 2025-04-09 ASSESSMENT — LID EXAM ASSESSMENTS
OD_BLEPHARITIS: RLL RUL 2+
OS_BLEPHARITIS: LLL LUL 2+

## 2025-04-09 ASSESSMENT — TEAR BREAK UP TIME (TBUT)
OS_TBUT: 1+
OD_TBUT: 1+

## 2025-04-09 ASSESSMENT — REFRACTION_CURRENTRX
OD_OVR_VA: 20/
OD_SPHERE: +2.00
OD_CYLINDER: -2.00
OD_AXIS: 89
OS_ADD: +3.25
OS_OVR_VA: 20/
OS_AXIS: 73
OS_SPHERE: +1.75
OD_ADD: +3.25
OS_CYLINDER: -1.75

## 2025-04-09 ASSESSMENT — CORNEAL EDEMA - FOLDS/STRIAE: OS_FOLDSSTRIAE: 3+

## 2025-04-09 ASSESSMENT — SUPERFICIAL PUNCTATE KERATITIS (SPK)
OS_SPK: 1+
OD_SPK: 1+

## 2025-04-09 ASSESSMENT — VISUAL ACUITY
OD_BCVA: 20/80
OS_BCVA: 20/30

## 2025-04-09 ASSESSMENT — CONFRONTATIONAL VISUAL FIELD TEST (CVF)
OS_FINDINGS: FULL
OD_FINDINGS: FULL